# Patient Record
Sex: MALE | Race: WHITE | NOT HISPANIC OR LATINO | Employment: FULL TIME | URBAN - METROPOLITAN AREA
[De-identification: names, ages, dates, MRNs, and addresses within clinical notes are randomized per-mention and may not be internally consistent; named-entity substitution may affect disease eponyms.]

---

## 2024-01-02 ENCOUNTER — TELEPHONE (OUTPATIENT)
Dept: UROLOGY | Facility: CLINIC | Age: 54
End: 2024-01-02

## 2024-01-02 NOTE — TELEPHONE ENCOUNTER
Patient called and asked to come in for acute visit tomorrow morning with Dr. Winters. Patient confirms appt and provided directions to building.

## 2024-01-03 ENCOUNTER — OFFICE VISIT (OUTPATIENT)
Dept: UROLOGY | Facility: CLINIC | Age: 54
End: 2024-01-03
Payer: COMMERCIAL

## 2024-01-03 VITALS
HEART RATE: 61 BPM | OXYGEN SATURATION: 98 % | HEIGHT: 70 IN | DIASTOLIC BLOOD PRESSURE: 82 MMHG | BODY MASS INDEX: 26.48 KG/M2 | RESPIRATION RATE: 18 BRPM | WEIGHT: 185 LBS | SYSTOLIC BLOOD PRESSURE: 130 MMHG

## 2024-01-03 DIAGNOSIS — Z12.5 SCREENING FOR PROSTATE CANCER: Primary | ICD-10-CM

## 2024-01-03 DIAGNOSIS — N41.9 PROSTATITIS, UNSPECIFIED PROSTATITIS TYPE: ICD-10-CM

## 2024-01-03 LAB
POST-VOID RESIDUAL VOLUME, ML POC: 27 ML
SL AMB  POCT GLUCOSE, UA: NORMAL
SL AMB LEUKOCYTE ESTERASE,UA: NORMAL
SL AMB POCT BILIRUBIN,UA: NORMAL
SL AMB POCT BLOOD,UA: NORMAL
SL AMB POCT CLARITY,UA: CLEAR
SL AMB POCT COLOR,UA: YELLOW
SL AMB POCT KETONES,UA: NORMAL
SL AMB POCT NITRITE,UA: NORMAL
SL AMB POCT PH,UA: 5
SL AMB POCT SPECIFIC GRAVITY,UA: 1.01
SL AMB POCT URINE PROTEIN: NORMAL
SL AMB POCT UROBILINOGEN: 0.2

## 2024-01-03 PROCEDURE — 81002 URINALYSIS NONAUTO W/O SCOPE: CPT | Performed by: UROLOGY

## 2024-01-03 PROCEDURE — 51798 US URINE CAPACITY MEASURE: CPT | Performed by: UROLOGY

## 2024-01-03 PROCEDURE — 99203 OFFICE O/P NEW LOW 30 MIN: CPT | Performed by: UROLOGY

## 2024-01-03 RX ORDER — CEPHALEXIN 500 MG/1
CAPSULE ORAL
COMMUNITY
Start: 2023-12-23

## 2024-01-03 NOTE — PROGRESS NOTES
Referring Physician: Lissy Arias DO  A copy of this note was sent to the referring physician.       Diagnoses and all orders for this visit:    Screening for prostate cancer  -     PSA, Total Screen; Future    Prostatitis, unspecified prostatitis type  -     POCT urine dip  -     POCT Measure PVR    Other orders  -     cephalexin (KEFLEX) 500 mg capsule; take 1 capsule by mouth twice a day for 7 days            Assessment and plan:       1.  Urethritis  - Current AUA symptom score is 5  - PVR 27  - UA is negative  - dysuria and RLQ discomfort, no perineal discomfort  -     2. Men's Health  - 4 days per week - mixing cardio resistance training  - mostly coffee drinker - 36 ounces, 12 ounces kombucha, no water  -Negative digital rectal examination  - PSA pending    Patient's history and physical exam is consistent with urethritis.  I recommended increasing his intake of free water.  NSAIDs as needed.  I provided a copy of my handout on increasing fluid intake.    I have ordered a PSA to complete his prostate cancer screening evaluation and we will touch base with him electronically or by phone the PSA results are complete.        Eugene Winters MD      Chief Complaint     Prostatitis consult      History of Present Illness     Jani Hi is a 53 y.o. referred for prostatitis    Detailed Urologic History     - please refer to HPI    Review of Systems     Review of Systems   Constitutional: Negative for activity change and fatigue.   HENT: Negative for congestion.    Eyes: Negative for visual disturbance.   Respiratory: Negative for shortness of breath and wheezing.    Cardiovascular: Negative for chest pain and leg swelling.   Gastrointestinal: Negative for abdominal pain.   Endocrine: Negative for polyuria.   Genitourinary: Negative for dysuria, flank pain, hematuria and urgency.   Musculoskeletal: Negative for back pain.   Allergic/Immunologic: Negative for immunocompromised state.    Neurological: Negative for dizziness and numbness.   Psychiatric/Behavioral: Negative for dysphoric mood.   All other systems reviewed and are negative.      AUA SYMPTOM SCORE      Flowsheet Row Most Recent Value   AUA SYMPTOM SCORE    How often have you had a sensation of not emptying your bladder completely after you finished urinating? 3   How often have you had to urinate again less than two hours after you finished urinating? 0   How often have you found you stopped and started again several times when you urinate? 1   How often have you found it difficult to postpone urination? 0   How often have you had a weak urinary stream? 0   How often have you had to push or strain to begin urination? 0   How many times did you most typically get up to urinate from the time you went to bed at night until the time you got up in the morning? 1   Quality of Life: If you were to spend the rest of your life with your urinary condition just the way it is now, how would you feel about that? 2   AUA SYMPTOM SCORE 5              Allergies     Allergies   Allergen Reactions    Penicillins Rash       Physical Exam       Physical Exam  Constitutional:       General: He is not in acute distress.     Appearance: He is well-developed.   HENT:      Head: Normocephalic and atraumatic.   Cardiovascular:      Comments: Negative lower extremity edema  Pulmonary:      Effort: Pulmonary effort is normal.      Breath sounds: Normal breath sounds.   Abdominal:      Palpations: Abdomen is soft.   Musculoskeletal:         General: Normal range of motion.      Cervical back: Normal range of motion.   Skin:     General: Skin is warm.   Neurological:      Mental Status: He is alert and oriented to person, place, and time.   Psychiatric:         Behavior: Behavior normal.   45 g gland no nodules or induration        Vital Signs  Vitals:    01/03/24 1001   BP: 130/82   BP Location: Left arm   Patient Position: Sitting   Cuff Size: Standard   Pulse:  "61   Resp: 18   SpO2: 98%   Weight: 83.9 kg (185 lb)   Height: 5' 10\" (1.778 m)         Current Medications       Current Outpatient Medications:     cephalexin (KEFLEX) 500 mg capsule, take 1 capsule by mouth twice a day for 7 days, Disp: , Rfl:       Active Problems     There is no problem list on file for this patient.        Past Medical History     History reviewed. No pertinent past medical history.      Surgical History     Past Surgical History:   Procedure Laterality Date    ANKLE SURGERY Left     VASECTOMY Bilateral          Family History     History reviewed. No pertinent family history.      Social History     Social History     Social History     Tobacco Use   Smoking Status Never   Smokeless Tobacco Never         Pertinent Lab Values     No results found for: \"CREATININE\"    No results found for: \"PSA\"    @RESULTRCNT(1H])@      Pertinent Imaging       No results found.      Portions of the record may have been created with voice recognition software.  Occasional wrong word or \"sound a like\" substitutions may have occurred due to the inherent limitations of voice recognition software.  In addition some of the content generated from this outpatient encounter includes information designed for patient education and/or communication back to the referring provider.  Read the chart carefully and recognize, using context, where substitutions have occurred.    "

## 2024-01-03 NOTE — PATIENT INSTRUCTIONS
How to Increase your Healthy Fluid Intake - A Urologist's Advice    Eugene Winters MD,PhD  [facility]  [phone]    Increasing your daily dietary fluid intake can be a simple solution to many urologic problems.  You have been advised by your Urology team to increase her fluid intake.  Here are some helpful pieces of advice and guidance    How much should I drink each day?  A general goal for most healthy individuals is to consume 64 ounces daily.  This equates to 2 Liters, or 8 glasses daily.  Highly active and performance-oriented individuals may need more water.  Those who experience certain type of incontinence, or with heart or kidney insufficiency, should consume less.    What should I drink?  WATER is the absolute best fluid available.  In a sense, it is the only healthy you can drink.    How can I tell if I am drinking enough water to keep my body healthy?  Your urine should be clear -- every time your urinate!  When your pee is yellow, you need more.  Please note that multivitamins and some prescription medication can discolor the urine.    What to avoid:  - Sugar-containing drinks.  This includes gatorades, and even juice!    - Artificial Sweeteners - is often marketed as a more healthy alternative to sugar containing drinks.  However, these chemicals are not at all healthy.  Many contain chemicals that can exacerbate overactive bladder, and impair kidney and natural hormonal function.  - Sodas.  These hurt your urologic health in many ways.  The acids and sugars/artificial sweeteners make their way into your urine, and can cause bladder overactivity.  They cause your kidneys to temporarily make MORE URINE (more trips to the bathroom).  After consumption, they then result in dehydration - making your urine more concentrated and painful to pass.  - Energy drinks - these are the worst!  We have seen kidney stones, horribly painful urinary conditions, and kidney and heart  conditions caused entirely by these.  Aside from these extreme conditions, the cycle of energy spikes and crashes is truly awful.  Stay away!    What about caffeine?  Try to stick to 1 cup of coffee in the morning if able.  Healthy diet and exercise will provide better and longer lasting energy in the afternoons, and avoid the cycle of energy spikes and crashes (and the harmful chemicals) found in energy drinks.  Be sure to drink some water after your morning coffee to prevent dehydration    Tips or Hacks to help with fluid intake:  - Purchase a refillable water bottle.  Carry this throughout your day.  This helps tracks your daily consumption, and minimize waste  - if you tend to dislike the taste or sensation of drinking plain water, try using a bottle with a straw.  This can help make increasing your fluid intake way easier!  - Put a slice of fresh fruit in your water bottle.  Anything that is in the fridge - lemon, lime, melons, apples, orange slice (or peel!).  Get some delicious fresh taste without any artificial chemicals or processed carbohydrates!

## 2024-02-01 ENCOUNTER — APPOINTMENT (OUTPATIENT)
Dept: LAB | Facility: CLINIC | Age: 54
End: 2024-02-01
Payer: COMMERCIAL

## 2024-02-01 DIAGNOSIS — Z12.5 SCREENING FOR PROSTATE CANCER: ICD-10-CM

## 2024-02-01 LAB — PSA SERPL-MCNC: 0.68 NG/ML (ref 0–4)

## 2024-02-01 PROCEDURE — 36415 COLL VENOUS BLD VENIPUNCTURE: CPT

## 2024-02-01 PROCEDURE — G0103 PSA SCREENING: HCPCS

## 2024-02-06 ENCOUNTER — TELEPHONE (OUTPATIENT)
Dept: UROLOGY | Facility: CLINIC | Age: 54
End: 2024-02-06

## 2024-02-06 NOTE — TELEPHONE ENCOUNTER
Please notify the patient that his PSA is well within normal limits, no clinical concern for prostate cancer.    Please also ask for a clinical update about his symptoms of urethritis.  Have they improved since I saw him in the office ?    Please let me know.  If he still remains symptomatic he is certainly eligible for medical therapy which I would initiate at this time.

## 2024-02-08 NOTE — TELEPHONE ENCOUNTER
"VM left requesting call back     When patient calls back please relay the following message from Dr. Winters-  \"Please notify the patient that his PSA is well within normal limits, no clinical concern for prostate cancer.     Please also ask for a clinical update about his symptoms of urethritis.  Have they improved since I saw him in the office ?     Please let me know.  If he still remains symptomatic he is certainly eligible for medical therapy which I would initiate at this time.\"  "

## 2024-02-08 NOTE — TELEPHONE ENCOUNTER
Pt was returning the call.  When I tried to connect the pt he stated that he had to go and he disconnected the call.

## 2024-11-11 ENCOUNTER — HOSPITAL ENCOUNTER (OUTPATIENT)
Dept: RADIOLOGY | Facility: HOSPITAL | Age: 54
Discharge: HOME | End: 2024-11-11
Attending: SURGERY
Payer: COMMERCIAL

## 2024-11-11 ENCOUNTER — OFFICE VISIT (OUTPATIENT)
Dept: OBGYN CLINIC | Facility: HOSPITAL | Age: 54
End: 2024-11-11
Payer: COMMERCIAL

## 2024-11-11 DIAGNOSIS — G56.21 NEURITIS OF RIGHT ULNAR NERVE: ICD-10-CM

## 2024-11-11 DIAGNOSIS — M65.4 RADIAL STYLOID TENOSYNOVITIS (DE QUERVAIN): Primary | ICD-10-CM

## 2024-11-11 DIAGNOSIS — M79.641 PAIN OF RIGHT HAND: ICD-10-CM

## 2024-11-11 PROBLEM — M77.02 MEDIAL EPICONDYLITIS OF BOTH ELBOWS: Status: RESOLVED | Noted: 2024-11-11 | Resolved: 2024-11-11

## 2024-11-11 PROBLEM — M77.02 MEDIAL EPICONDYLITIS OF BOTH ELBOWS: Status: ACTIVE | Noted: 2024-11-11

## 2024-11-11 PROBLEM — M77.01 MEDIAL EPICONDYLITIS OF BOTH ELBOWS: Status: ACTIVE | Noted: 2024-11-11

## 2024-11-11 PROBLEM — M77.01 MEDIAL EPICONDYLITIS OF BOTH ELBOWS: Status: RESOLVED | Noted: 2024-11-11 | Resolved: 2024-11-11

## 2024-11-11 PROCEDURE — 20550 NJX 1 TENDON SHEATH/LIGAMENT: CPT | Performed by: SURGERY

## 2024-11-11 PROCEDURE — 73130 X-RAY EXAM OF HAND: CPT

## 2024-11-11 PROCEDURE — 99203 OFFICE O/P NEW LOW 30 MIN: CPT | Performed by: SURGERY

## 2024-11-11 RX ORDER — LIDOCAINE HYDROCHLORIDE 10 MG/ML
1 INJECTION, SOLUTION INFILTRATION; PERINEURAL
Status: COMPLETED | OUTPATIENT
Start: 2024-11-11 | End: 2024-11-11

## 2024-11-11 RX ORDER — DEXAMETHASONE SODIUM PHOSPHATE 10 MG/ML
40 INJECTION, SOLUTION INTRAMUSCULAR; INTRAVENOUS
Status: COMPLETED | OUTPATIENT
Start: 2024-11-11 | End: 2024-11-11

## 2024-11-11 RX ADMIN — DEXAMETHASONE SODIUM PHOSPHATE 40 MG: 10 INJECTION, SOLUTION INTRAMUSCULAR; INTRAVENOUS at 08:15

## 2024-11-11 RX ADMIN — LIDOCAINE HYDROCHLORIDE 1 ML: 10 INJECTION, SOLUTION INFILTRATION; PERINEURAL at 08:15

## 2024-11-11 NOTE — PATIENT INSTRUCTIONS
What is De Quervain Syndrome?  Patients with de Quervain syndrome have painful tendons on the thumb side of the wrist. Tendons are the ropes that the muscle uses to pull the bone. You can see them on the back of your hand when you straighten your fingers. In  de Quervain syndrome, the tunnel (the first extensor compartment; see Figure 1A-B) where the tendons run narrows due to the thickening of the soft tissues that make up the tunnel. Hand and thumb motion cause pain, especially with forceful grasping or twisting.       Causes  Doctors are not sure what causes de Quervain syndrome. Patients often describe a feeling of inflammation, but studies have shown that the process is not inflammatory. People of all ages get it. When new mothers develop  de Quervain syndrome, it typically appears 4 to 6 weeks after delivery. The old theory that it was caused by wringing out cloth diapers has been replaced by concerns about holding the baby, but changes in hormones and swelling seem more probable.      Treatment  Treatments that can relieve symptoms include:   A splint that stops you from moving your thumb and wrist.   Tylenol or aspirin type medications (e.g., ibuprofen).    Treatments that attempt to change the course of the disease include:   A cortisone-type of steroid injection into the tendon compartment.  It has not been clearly established that    these injections change the course of the disease and response to the injection varies.   Surgery to open the tunnel and make more room for the tendons.    © 2012 American [employer] for [employer] of the Hand  www.handcare.org

## 2024-11-11 NOTE — PROGRESS NOTES
Minh Vital M.D.  Attending, Orthopaedic Surgery  Hand, Wrist, and Elbow Surgery  [facility]      ORTHOPAEDIC HAND, WRIST, AND ELBOW OFFICE  VISIT       ASSESSMENT/PLAN:      53 yo male with right dequervain's tenosynovitis and right ulnar neuritis (tenderness at 2 heads of FCU without paresthesias)     X-rays of the right wrist were obtained and reviewed in office today demonstrating no acute fracture or significant degenerative changes.  On exam patient has tenderness over the first dorsal compartment/radial styloid accompanied with some burning pain which we discussed is likely irritation of his dorsal radial sensory nerve.  In addition patient is objects symptoms consistent with some ulnar nerve irritation at the 2 heads of the FCU.  We discussed the anatomy and physiology of the above diagnoses along with treatment options ranging from conservative to operative.  In regards to the de Quervain's patient was provided with an initial steroid injection today to the first dorsal compartment.  He was advised to wear a thumb spica wrist brace nightly and work regularly with therapy or home exercise program which was provided to him today.  If symptoms improve but still persist to some degree we can consider a second steroid injection in about 6 weeks.  If no improvement we will likely discuss surgical intervention  In regards to the right elbow patient was provided with some home exercises for ulnar nerve glides.  If symptoms persist or do not improve can consider ultrasound of the right ulnar nerve for further evaluation   Follow-up in about 6 to 8 weeks if symptoms persist    The patient verbalized understanding of exam findings and treatment plan. We engaged in the shared decision-making process and treatment options were discussed at length with the patient. Surgical and conservative management discussed today along with risks and benefits.    Diagnoses and all orders for this  visit:    Radial styloid tenosynovitis (de quervain)  -     XR hand 3+ vw right; Future  -     Hand/upper extremity injection    Neuritis of right ulnar nerve        Follow Up:  Return in about 8 weeks (around 1/6/2025) for Recheck.    To Do Next Visit:  Re-evaluation of current issue      General Discussions:  De Quervain Tenosynovitis: The anatomy and physiology of de Quervain's tenosynovitis was discussed with the patient today in the office.  Edema and increased contact pressure within the first dorsal extensor compartment at the radial styloid can cause pain, crepitation, and limitation of function.  Treatment options include resting thumb spica splints to decrease edema, oral anti-inflammatory medications, home or formal therapy exercises, up to 2 steroid injections within the first dorsal extensor compartment, or surgical release.  While majority of patients do respond to conservative treatment, up to 20% may require surgical release.       ____________________________________________________________________________________________________________________________________________      CHIEF COMPLAINT:  Chief Complaint   Patient presents with    Right Wrist - Pain     Patient has pain and burning in the right wrist. Wearing a wrist brace he thinks this helps him       SUBJECTIVE:  Jani Hi is a 54 y.o. year old RHD male who presents for evaluation of the right wrist primarily. He notes pain over the radial wrist worse with ulnar deviation of the wrist and thumb opposition/deep flexion. He also notes medial elbow pain bilaterally but worse on the right.   Symptoms have been ongoing for a few months, worsen with working out. He denies associated paresthesias but does have a burning sensation over the radial wrist and dorsal hand. He does have a brace that he bought which he wears at night which helps somewhat. No prior treatment with anti-inflammatories, therapy, or injections.       Pain/symptom timing:   Worse during the day when active  Pain/symptom context:  Worse with activites and work  Pain/symptom modifying factors:  Rest makes better, activities make worse  Pain/symptom associated signs/symptoms: none    Prior treatment   NSAIDsNo   Injections No   Bracing/Orthotics Yes    Physical Therapy No     I have personally reviewed all the relevant PMH, PSH, SH, FH, Medications and allergies      PAST MEDICAL HISTORY:  History reviewed. No pertinent past medical history.    PAST SURGICAL HISTORY:  Past Surgical History:   Procedure Laterality Date    ANKLE SURGERY Left     VASECTOMY Bilateral        FAMILY HISTORY:  History reviewed. No pertinent family history.    SOCIAL HISTORY:  Social History     Tobacco Use    Smoking status: Never    Smokeless tobacco: Never   Substance Use Topics    Alcohol use: Yes     Comment: on occasion    Drug use: Never       MEDICATIONS:    Current Outpatient Medications:     cephalexin (KEFLEX) 500 mg capsule, take 1 capsule by mouth twice a day for 7 days, Disp: , Rfl:     ALLERGIES:  Allergies   Allergen Reactions    Penicillins Rash           REVIEW OF SYSTEMS:  Review of Systems   Constitutional:  Negative for chills, fatigue, fever and unexpected weight change.   HENT:  Negative for hearing loss, nosebleeds and sore throat.    Eyes:  Negative for pain, redness and visual disturbance.   Respiratory:  Negative for cough, shortness of breath and wheezing.    Cardiovascular:  Negative for chest pain, palpitations and leg swelling.   Gastrointestinal:  Negative for abdominal pain, nausea and vomiting.   Endocrine: Negative for polydipsia and polyuria.   Genitourinary:  Negative for difficulty urinating and hematuria.   Musculoskeletal:  Positive for arthralgias. Negative for joint swelling and myalgias.   Skin:  Negative for rash and wound.   Neurological:  Negative for dizziness, numbness and headaches.   Psychiatric/Behavioral:  Negative for decreased concentration, dysphoric mood  "and suicidal ideas. The patient is not nervous/anxious.        VITALS:  There were no vitals filed for this visit.    LABS:      _____________________________________________________  PHYSICAL EXAMINATION:  General: well developed and well nourished, alert, oriented times 3, and appears comfortable  Psychiatric: Normal  HEENT: Normocephalic, Atraumatic Trachea Midline, No torticollis  Pulmonary: No audible wheezing or respiratory distress   Abdomen/GI: Non tender, non distended   Cardiovascular: No pitting edema, 2+ radial pulse   Skin: No masses, erythema, lacerations, fluctation, ulcerations  Neurovascular: Sensation Intact to the Median, Ulnar, Radial Nerve, Motor Intact to the Median, Ulnar, Radial Nerve, and Pulses Intact  Musculoskeletal: Normal, except as noted in detailed exam and in HPI.      MUSCULOSKELETAL EXAMINATION:    Right Elbow:  No swelling or deformity  Full range of motion with flexion, extension, supination and pronation.  + tenderness at the cubital tunnel at the 2 heads of the FCU  No pain with passive flexion of the wrist with elbow fully extended.    De Quervain's Tenosynovitis Exam:  right side    Positive tender to palpation over 1st dorsal extensor compartment   Negative palpable nodule  Negative crepitus over 1st dorsal extensor compartment   Positive Finkelstein's test    Positive pain with resisted abduction of the thumb          ___________________________________________________  STUDIES REVIEWED:  I have personally reviewed and interpreted  AP lateral and oblique radiographs of the right wrist which demonstrate no acute fracture, no significant degenerative changes     LABS REVIEWED:    HgA1c: No results found for: \"HGBA1C\"  BMP: No results found for: \"GLUCOSE\", \"CALCIUM\", \"NA\", \"K\", \"CO2\", \"CL\", \"BUN\", \"CREATININE\"            PROCEDURES PERFORMED:  Hand/upper extremity injection: R extensor compartment 1  Universal Protocol:  procedure performed by consultantConsent: Verbal consent " "obtained.  Risks and benefits: risks, benefits and alternatives were discussed  Consent given by: patient  Time out: Immediately prior to procedure a \"time out\" was called to verify the correct patient, procedure, equipment, support staff and site/side marked as required.  Patient understanding: patient states understanding of the procedure being performed  Site marked: the operative site was marked  Required items: required blood products, implants, devices, and special equipment available  Patient identity confirmed: verbally with patient  Supporting Documentation  Indications: pain and therapeutic   Procedure Details  Condition:de Quervain's tenosynovitis Site: R extensor compartment 1   Preparation: Patient was prepped and draped in the usual sterile fashion  Needle size: 25 G  Ultrasound guidance: no  Approach: radial  Medications administered: 1 mL lidocaine 1 %; 40 mg dexamethasone 100 mg/10 mL  Patient tolerance: patient tolerated the procedure well with no immediate complications  Dressing:  Sterile dressing applied              _____________________________________________________      Scribe Attestation      I,:  Linsey Gonzales PA-C am acting as a scribe while in the presence of the attending physician.:       I,:  Minh Vital MD personally performed the services described in this documentation    as scribed in my presence.:                     "

## 2025-01-06 ENCOUNTER — OFFICE VISIT (OUTPATIENT)
Dept: OBGYN CLINIC | Facility: HOSPITAL | Age: 55
End: 2025-01-06
Payer: COMMERCIAL

## 2025-01-06 VITALS — BODY MASS INDEX: 26.48 KG/M2 | HEIGHT: 70 IN | WEIGHT: 185 LBS

## 2025-01-06 DIAGNOSIS — G56.21 NEURITIS OF RIGHT ULNAR NERVE: ICD-10-CM

## 2025-01-06 DIAGNOSIS — G56.31: ICD-10-CM

## 2025-01-06 DIAGNOSIS — M65.4 RADIAL STYLOID TENOSYNOVITIS (DE QUERVAIN): Primary | ICD-10-CM

## 2025-01-06 PROCEDURE — 20550 NJX 1 TENDON SHEATH/LIGAMENT: CPT | Performed by: SURGERY

## 2025-01-06 PROCEDURE — 99213 OFFICE O/P EST LOW 20 MIN: CPT | Performed by: SURGERY

## 2025-01-06 RX ORDER — DEXAMETHASONE SODIUM PHOSPHATE 10 MG/ML
40 INJECTION, SOLUTION INTRAMUSCULAR; INTRAVENOUS
Status: COMPLETED | OUTPATIENT
Start: 2025-01-06 | End: 2025-01-06

## 2025-01-06 RX ORDER — LIDOCAINE HYDROCHLORIDE 10 MG/ML
1 INJECTION, SOLUTION INFILTRATION; PERINEURAL
Status: COMPLETED | OUTPATIENT
Start: 2025-01-06 | End: 2025-01-06

## 2025-01-06 RX ADMIN — DEXAMETHASONE SODIUM PHOSPHATE 40 MG: 10 INJECTION, SOLUTION INTRAMUSCULAR; INTRAVENOUS at 10:00

## 2025-01-06 RX ADMIN — LIDOCAINE HYDROCHLORIDE 1 ML: 10 INJECTION, SOLUTION INFILTRATION; PERINEURAL at 10:00

## 2025-01-06 NOTE — PROGRESS NOTES
ASSESSMENT/PLAN:      55 yo male with right dequervain's tenosynovitis and right ulnar neuritis (tenderness at 2 heads of FCU without paresthesias)(resolved with ulnar nerve stretching exercises) and right wrist dorsal radial sensory nerve branch irritation     On PE, he has tenderness to the 1st dorsal compartment, over the EPL tendon as well as + tinel's over the dorsal radial sensory nerve branch, which we discussed is secondary to the overlying tendinitis. Treatment options were discussed in the form of a 2nd De Quervain's CSI vs surgical intervention in the form of a right De Quervain's release. We discussed OT can be beneficial for dorsal radial sensory nerve branch irritation as they can mobilize the nerve and perform modalities as seen fit, OT script was provided. A 2nd right De Quervain's CSI was performed in the office without complication. Post injection protocol/expectations were reviewed. Follow up in 6 weeks time if symptoms worsen or fail to improve.     The patient verbalized understanding of exam findings and treatment plan. We engaged in the shared decision-making process and treatment options were discussed at length with the patient. Surgical and conservative management discussed today along with risks and benefits.    Diagnoses and all orders for this visit:    Radial styloid tenosynovitis (de quervain)  -     Ambulatory Referral to PT/OT Hand Therapy; Future  -     Hand/upper extremity injection: R extensor compartment 1    Neuritis of right ulnar nerve    Irritation of right radial nerve        Follow Up:  Return in about 6 weeks (around 2/17/2025), or if symptoms worsen or fail to improve.      To Do Next Visit:  Re-evaluation of current issue    ____________________________________________________________________________________________________________________________________________      CHIEF COMPLAINT:  Chief Complaint   Patient presents with    Follow-up     Patient would like an  injection today last injection was on 11/11/24       SUBJECTIVE:  Jani Hi is a 54 y.o. year old RHD male who presents to the office for a follow up regarding his right wrist and right elbow. He underwent a right De Quervain's CSI on 11/11/24. He notes pain to the radial aspect of his right wrist. He also notes a burning pain to the are. His right elbow is doing well since performing ulnar nerve stretching exercises. He has had to change the way he picks things up secondary to the radial wrist pain.        I have personally reviewed all the relevant PMH, PSH, SH, FH, Medications and allergies.     PAST MEDICAL HISTORY:  History reviewed. No pertinent past medical history.    PAST SURGICAL HISTORY:  Past Surgical History:   Procedure Laterality Date    ANKLE SURGERY Left     VASECTOMY Bilateral        FAMILY HISTORY:  History reviewed. No pertinent family history.    SOCIAL HISTORY:  Social History     Tobacco Use    Smoking status: Never    Smokeless tobacco: Never   Substance Use Topics    Alcohol use: Yes     Comment: on occasion    Drug use: Never       MEDICATIONS:    Current Outpatient Medications:     cephalexin (KEFLEX) 500 mg capsule, take 1 capsule by mouth twice a day for 7 days, Disp: , Rfl:     ALLERGIES:  Allergies   Allergen Reactions    Penicillins Rash       REVIEW OF SYSTEMS:  Review of Systems   Constitutional:  Negative for chills, fever and unexpected weight change.   HENT:  Negative for hearing loss, nosebleeds and sore throat.    Eyes:  Negative for pain, redness and visual disturbance.   Respiratory:  Negative for cough, shortness of breath and wheezing.    Cardiovascular:  Negative for chest pain, palpitations and leg swelling.   Gastrointestinal:  Negative for abdominal pain, nausea and vomiting.   Endocrine: Negative for polydipsia and polyuria.   Genitourinary:  Negative for difficulty urinating and hematuria.   Musculoskeletal:  Negative for arthralgias, joint swelling and  "myalgias.   Skin:  Negative for rash and wound.   Neurological:  Negative for dizziness, numbness and headaches.   Psychiatric/Behavioral:  Negative for decreased concentration, dysphoric mood and suicidal ideas. The patient is not nervous/anxious.        VITALS:  There were no vitals filed for this visit.      _____________________________________________________  PHYSICAL EXAMINATION:  General: well developed and well nourished, alert, oriented times 3, and appears comfortable  Psychiatric: Normal  HEENT: Normocephalic, Atraumatic Trachea Midline, No torticollis  Pulmonary: No audible wheezing or respiratory distress   Cardiovascular: No pitting edema, 2+ radial pulse   Abdominal/GI: abdomen non tender, non distended   Skin: No masses, erythema, lacerations, fluctation, ulcerations  Neurovascular: Sensation Intact to the Median, Ulnar, Radial Nerve, Motor Intact to the Median, Ulnar, Radial Nerve, and Pulses Intact  Musculoskeletal: Normal, except as noted in detailed exam and in HPI.      MUSCULOSKELETAL EXAMINATION:    Right wrist:     No erythema, ecchymosis or edema  1st dorsal compartment tenderness  + tinel's dorsal radial sensory nerve branch   EPL tenderness   Pain with thumb extension   Full digital extension   Full composite fist   2+ radial pulse   ___________________________________________________    STUDIES REVIEWED:  No new imaging to review     LABS REVIEWED:    HgA1c: No results found for: \"HGBA1C\"  BMP: No results found for: \"GLUCOSE\", \"CALCIUM\", \"NA\", \"K\", \"CO2\", \"CL\", \"BUN\", \"CREATININE\"          PROCEDURES PERFORMED:  Hand/upper extremity injection: R extensor compartment 1  Universal Protocol:  procedure performed by consultantConsent: Verbal consent obtained. Written consent not obtained.  Risks and benefits: risks, benefits and alternatives were discussed  Consent given by: patient  Patient understanding: patient states understanding of the procedure being performed  Site marked: the " operative site was marked  Patient identity confirmed: verbally with patient  Supporting Documentation  Indications: pain   Procedure Details  Condition:de Quervain's tenosynovitis Site: R extensor compartment 1   Needle size: 25 G  Ultrasound guidance: no  Medications administered: 1 mL lidocaine 1 %; 40 mg dexamethasone 100 mg/10 mL  Patient tolerance: patient tolerated the procedure well with no immediate complications  Dressing:  Sterile dressing applied            _____________________________________________________      Scribe Attestation      I,:  Maria E Love MA am acting as a scribe while in the presence of the attending physician.:       I,:  Minh Vital MD personally performed the services described in this documentation    as scribed in my presence.:

## 2025-01-13 ENCOUNTER — TELEPHONE (OUTPATIENT)
Age: 55
End: 2025-01-13

## 2025-01-13 ENCOUNTER — PREP FOR PROCEDURE (OUTPATIENT)
Age: 55
End: 2025-01-13

## 2025-01-13 DIAGNOSIS — Z86.0100 HISTORY OF COLON POLYPS: Primary | ICD-10-CM

## 2025-01-13 NOTE — TELEPHONE ENCOUNTER
01/13/25  Screened by: Kelsea Garcia    Referring Provider Minh Arias    Pre- Screening:     There is no height or weight on file to calculate BMI.  Has patient been referred for a routine screening Colonoscopy? yes  Is the patient between 45-75 years old? yes      Previous Colonoscopy yes   If yes:    Date: 2020    Facility:     Reason:           Does the patient want to see a Gastroenterologist prior to their procedure OR are they having any GI symptoms? no    Has the patient been hospitalized or had abdominal surgery in the past 6 months? no    Does the patient use supplemental oxygen? no    Does the patient take Coumadin, Lovenox, Plavix, Elliquis, Xarelto, or other blood thinning medication? no    Has the patient had a stroke, cardiac event, or stent placed in the past year? no        If patient is between 45yrs - 49yrs, please advise patient that we will have to confirm benefits & coverage with their insurance company for a routine screening colonoscopy.

## 2025-01-13 NOTE — LETTER
Natalia Gunter,    Attached are your instructions for your upcoming procedure on 2/21/25. If you have any questions, please give us a call at [phone].    Thank you,    [facility]           Medicine Instructions for Adults with Diabetes who Need a Bowel Prep       Follow these instructions when a BOWEL PREP is required for your procedure or surgery!    NOTE:   GLP-1 Agonists taken weekly: do not take in the 7 days before your procedure   SGLT-2 Inhibitors: do not take in the 4 days before your procedure     On the Day Before Surgery/Procedure  If you are having a procedure (e.g. Colonoscopy) or surgery that requires a bowel prep and you may have at least a clear liquid diet, follow the directions below based on the type of medicine you take for your diabetes.     Type of Medicine You Take Examples What to do   Pre-Mixed Insulin - Intermediate Acting Humalog® 75/25, Humulin® 70/30, Novolog® 70/30, Regular Insulin Take ½ your regular dose the evening before your procedure   Rapid/Fast Acting Insulin Humalog® U200, NovoLog®, Apidra®, Fiasp® Take ½ your regular dose the evening before your procedure.   Long-Acting Insulin Lantus®, Levemir®, Tresiba®, Toujeo®, Basaglar® Take your FULL regular dose the day before procedure   Oral Sulfonylurea Glipizide/Glimepiride/Glucotrol® Take ½ your regular dose the evening before your procedure   Other Oral Diabetes Medicines Metformin®, Glucophage®, Glucophage XR®, Riomet®, Glumetza®), Actose®, Avandia®, Glyset®, Prandin® Take your regular dose with dinner in the evening before your procedure   GLP-1 Agonists AdlyxinÒ, ByettaÒ, BydureonÒ, OzempicÒ, SoliquaÒ, TanzeumÒ, TrulicityÒ, VictozaÒ, Saxenda®, Rybelsus® If taken daily, take as normal    If taken weekly, do not take this medicine for 7 days before your procedure including the day of the procedure (resume taking after the procedure)   SGLT-2 Inhibitors Jardiance®, Invokana®,  Farxiga®,   Steglatro®, Brenzavvy®, Qtern®, Segluromet®, Glyxambi®, Synjardy®, Synjardy XR®, Invokamet®, Invokamet XR®, Trijary XR®, Xigduo XR®, Steglujan® Do not take for 4 days before your procedure including the day of the procedure (resume taking after the procedure)                More information continued on back                    Medicine Instructions for Adults with Diabetes who Need a Bowel Prep  Page 2      On the Day of Surgery/Procedure  Follow the directions below based on the type of medicine you take for your diabetes.     Type of Medicine You Take Examples What to do   Long-Acting Insulin Lantus®, Levemir®, Tresiba®, Toujeo®, Basaglar®, Semglee®   If you usually take your Long-Acting Insulin in the morning, take the full dose as scheduled.   GLP-1 Agonists AdlyxinÒ, ByettaÒ, BydureonÒ, OzempicÒ, SoliquaÒ, TanzeumÒ, TrulicityÒ, VictozaÒ, Saxenda®, Rybelsus® Do NOT take this medicine on the day of your procedure (resume taking after the procedure)       On the Day of Surgery/Procedure (continued)  Except for the morning Long-Acting Insulin, DO NOT take ANY diabetic medicine on the day of your procedure unless you were instructed by the doctor who manages your diabetes medicines.    Continue to check your blood sugars.  If you have an insulin pump, ask your endocrinologist for instructions at least 3 days before your procedure. NOTE: If you are not able to ask your endocrinologist in advance, on the day of the procedure set your insulin pump to your basal rate only. Bring your insulin pump supplies to the hospital.     If you have any questions about taking your diabetes medicines prior to your procedure, please contact the doctor who manages your diabetes medicines.

## 2025-01-13 NOTE — TELEPHONE ENCOUNTER
Scheduled date of colonoscopy (as of today):2/21/25  Physician performing colonoscopy:Dr. Hall  [facility] of [facility]  Bowel prep reviewed with patient:Noe/dul prep instr sent to pts MyChart  Instructions reviewed with patient by:PAULA  Clearances: N/A

## 2025-02-07 ENCOUNTER — ANESTHESIA EVENT (OUTPATIENT)
Dept: ANESTHESIOLOGY | Facility: HOSPITAL | Age: 55
End: 2025-02-07

## 2025-02-07 ENCOUNTER — ANESTHESIA (OUTPATIENT)
Dept: ANESTHESIOLOGY | Facility: HOSPITAL | Age: 55
End: 2025-02-07

## 2025-02-21 ENCOUNTER — HOSPITAL ENCOUNTER (OUTPATIENT)
Dept: GASTROENTEROLOGY | Facility: AMBULARY SURGERY CENTER | Age: 55
Setting detail: OUTPATIENT SURGERY
End: 2025-02-21
Attending: COLON & RECTAL SURGERY
Payer: COMMERCIAL

## 2025-02-21 ENCOUNTER — ANESTHESIA EVENT (OUTPATIENT)
Dept: GASTROENTEROLOGY | Facility: AMBULARY SURGERY CENTER | Age: 55
End: 2025-02-21
Payer: COMMERCIAL

## 2025-02-21 VITALS
BODY MASS INDEX: 26.77 KG/M2 | DIASTOLIC BLOOD PRESSURE: 68 MMHG | SYSTOLIC BLOOD PRESSURE: 122 MMHG | WEIGHT: 187 LBS | RESPIRATION RATE: 18 BRPM | HEIGHT: 70 IN | HEART RATE: 70 BPM | OXYGEN SATURATION: 95 % | TEMPERATURE: 97.1 F

## 2025-02-21 DIAGNOSIS — Z86.0100 HISTORY OF COLON POLYPS: ICD-10-CM

## 2025-02-21 PROCEDURE — G0105 COLORECTAL SCRN; HI RISK IND: HCPCS | Performed by: COLON & RECTAL SURGERY

## 2025-02-21 RX ORDER — PROPOFOL 10 MG/ML
INJECTION, EMULSION INTRAVENOUS AS NEEDED
Status: DISCONTINUED | OUTPATIENT
Start: 2025-02-21 | End: 2025-02-21

## 2025-02-21 RX ORDER — SODIUM CHLORIDE, SODIUM LACTATE, POTASSIUM CHLORIDE, CALCIUM CHLORIDE 600; 310; 30; 20 MG/100ML; MG/100ML; MG/100ML; MG/100ML
INJECTION, SOLUTION INTRAVENOUS CONTINUOUS PRN
Status: DISCONTINUED | OUTPATIENT
Start: 2025-02-21 | End: 2025-02-21

## 2025-02-21 RX ORDER — LIDOCAINE HYDROCHLORIDE 10 MG/ML
INJECTION, SOLUTION EPIDURAL; INFILTRATION; INTRACAUDAL; PERINEURAL AS NEEDED
Status: DISCONTINUED | OUTPATIENT
Start: 2025-02-21 | End: 2025-02-21

## 2025-02-21 RX ADMIN — LIDOCAINE HYDROCHLORIDE 50 MG: 10 INJECTION, SOLUTION EPIDURAL; INFILTRATION; INTRACAUDAL at 10:12

## 2025-02-21 RX ADMIN — PROPOFOL 100 MG: 10 INJECTION, EMULSION INTRAVENOUS at 10:12

## 2025-02-21 RX ADMIN — PROPOFOL 40 MG: 10 INJECTION, EMULSION INTRAVENOUS at 10:13

## 2025-02-21 RX ADMIN — PROPOFOL 30 MG: 10 INJECTION, EMULSION INTRAVENOUS at 10:24

## 2025-02-21 RX ADMIN — PROPOFOL 40 MG: 10 INJECTION, EMULSION INTRAVENOUS at 10:15

## 2025-02-21 RX ADMIN — SODIUM CHLORIDE, SODIUM LACTATE, POTASSIUM CHLORIDE, AND CALCIUM CHLORIDE: .6; .31; .03; .02 INJECTION, SOLUTION INTRAVENOUS at 10:08

## 2025-02-21 RX ADMIN — PROPOFOL 50 MG: 10 INJECTION, EMULSION INTRAVENOUS at 10:17

## 2025-02-21 NOTE — H&P
History and Physical   Colon and Rectal Surgery   Jani Hi 55 y.o. male MRN: [id number]  Unit/Bed#:  Encounter: [id number]  02/21/25   @NOW    No chief complaint on file.        History of Present Illness   HPI:  Jani Hi is a 55 y.o. male who presents for surveillance colonoscopy for personal history of polyps.      Historical Information   No past medical history on file.  Past Surgical History:   Procedure Laterality Date    ANKLE SURGERY Left     VASECTOMY Bilateral        Meds/Allergies     Not in a hospital admission.      Current Outpatient Medications:     cephalexin (KEFLEX) 500 mg capsule, take 1 capsule by mouth twice a day for 7 days, Disp: , Rfl:     Allergies   Allergen Reactions    Penicillins Rash         Social History   Social History     Substance and Sexual Activity   Alcohol Use Yes    Comment: on occasion     Social History     Substance and Sexual Activity   Drug Use Never     Social History     Tobacco Use   Smoking Status Never   Smokeless Tobacco Never         Family History: No family history on file.      Objective     Current Vitals:      No intake or output data in the 24 hours ending 02/21/25 0920    Physical Exam:  General:  Resting comfortably in bed   Eyes:Sclera anicteric  ENT: Trachea midline  Pulm:  Symmetric chest raise.  No respiratory Distress  CV:  Regular on monitor  Abdomen:  Soft NT ND  Extremities:  No clubbing/ cyanosis/ edema    Lab Results: I have personally reviewed pertinent lab results.    Imaging: Results Review Statement: No pertinent imaging studies reviewed.      ASSESSMENT:  Jani Hi is a 55 y.o. male who presents for outpatient colonoscopy.      PLAN:  For colonoscopy    Risks/ Benefits reviewed to include but not limited to anesthesia, bleeding, missed lesions, and colonoscopic perforation requiring surgery.

## 2025-02-21 NOTE — ANESTHESIA PREPROCEDURE EVALUATION
Procedure:  COLONOSCOPY    Relevant Problems   No relevant active problems     No past medical history on file.      Physical Exam    Airway    Mallampati score: II  TM Distance: >3 FB  Neck ROM: full     Dental   No notable dental hx     Cardiovascular  Rhythm: regular, Rate: normal    Pulmonary   Breath sounds clear to auscultation    Other Findings  Intercisor Distance > 3cm          Anesthesia Plan  ASA Score- 1     Anesthesia Type- IV sedation with anesthesia with ASA Monitors.         Additional Monitors:     Airway Plan:     Comment: NPO appropriate. Discussed benefits/risks of monitored anesthetic care which involves providing a dynamic level of mild to deep sedation. Complications include awareness and/or airway obstruction/aspiration which may necessitate conversion to general anesthesia. All questions answered. Patient understands and wishes to proceed. .       Plan Factors-Exercise tolerance (METS): >4 METS.    Chart reviewed. EKG reviewed.  Existing labs reviewed.                   Induction-     Postoperative Plan- Plan for postoperative opioid use.         Informed Consent- Anesthetic plan and risks discussed with patient.  I personally reviewed this patient with the CRNA. Discussed and agreed on the Anesthesia Plan with the CRNA..      NPO Status:  Vitals Value Taken Time   Date of last liquid 02/21/25 02/21/25 0941   Time of last liquid 0630 02/21/25 0941   Date of last solid 02/19/25 02/21/25 0941   Time of last solid 1800 02/21/25 0941

## 2025-02-21 NOTE — ANESTHESIA POSTPROCEDURE EVALUATION
Caller: Patient    Doctor: Daniel Abraham    Reason for call:    Patient is calling on the auth for her MRI for 12/8/22  For left shoulder, has this been approved?     Call back#: work number is [phone] Post-Op Assessment Note    CV Status:  Stable  Pain Score: 5 (RIGHT arm discomfort from infiltrated IV)    Pain management: adequate       Mental Status:  Alert and awake   Hydration Status:  Euvolemic and stable   PONV Controlled:  Controlled   Airway Patency:  Patent and adequate     Post Op Vitals Reviewed: Yes    No anethesia notable event occurred.    Staff: CRNA       Last Filed PACU Vitals:  Vitals Value Taken Time   Temp     Pulse     BP     Resp     SpO2